# Patient Record
Sex: MALE | Race: BLACK OR AFRICAN AMERICAN | Employment: FULL TIME
[De-identification: names, ages, dates, MRNs, and addresses within clinical notes are randomized per-mention and may not be internally consistent; named-entity substitution may affect disease eponyms.]

---

## 2024-09-03 ENCOUNTER — HOSPITAL ENCOUNTER (EMERGENCY)
Facility: HOSPITAL | Age: 49
Discharge: HOME | End: 2024-09-03
Attending: EMERGENCY MEDICINE
Payer: COMMERCIAL

## 2024-09-03 ENCOUNTER — APPOINTMENT (OUTPATIENT)
Facility: HOSPITAL | Age: 49
End: 2024-09-03
Payer: COMMERCIAL

## 2024-09-03 VITALS
RESPIRATION RATE: 17 BRPM | HEIGHT: 73 IN | HEART RATE: 81 BPM | SYSTOLIC BLOOD PRESSURE: 147 MMHG | WEIGHT: 265 LBS | TEMPERATURE: 98 F | DIASTOLIC BLOOD PRESSURE: 84 MMHG | BODY MASS INDEX: 35.12 KG/M2 | OXYGEN SATURATION: 99 %

## 2024-09-03 DIAGNOSIS — S89.91XA INJURY OF RIGHT LOWER EXTREMITY, INITIAL ENCOUNTER: Primary | ICD-10-CM

## 2024-09-03 PROCEDURE — 73590 X-RAY EXAM OF LOWER LEG: CPT

## 2024-09-03 PROCEDURE — 6370000000 HC RX 637 (ALT 250 FOR IP)

## 2024-09-03 PROCEDURE — 73610 X-RAY EXAM OF ANKLE: CPT

## 2024-09-03 PROCEDURE — 99283 EMERGENCY DEPT VISIT LOW MDM: CPT

## 2024-09-03 RX ORDER — DICLOFENAC SODIUM 75 MG/1
75 TABLET, DELAYED RELEASE ORAL 2 TIMES DAILY
Qty: 60 TABLET | Refills: 0 | Status: SHIPPED | OUTPATIENT
Start: 2024-09-03

## 2024-09-03 RX ORDER — IBUPROFEN 600 MG/1
600 TABLET, FILM COATED ORAL
Status: COMPLETED | OUTPATIENT
Start: 2024-09-03 | End: 2024-09-03

## 2024-09-03 RX ADMIN — IBUPROFEN 600 MG: 600 TABLET, FILM COATED ORAL at 14:45

## 2024-09-03 ASSESSMENT — PAIN DESCRIPTION - PAIN TYPE: TYPE: ACUTE PAIN

## 2024-09-03 ASSESSMENT — PAIN DESCRIPTION - LOCATION
LOCATION: LEG

## 2024-09-03 ASSESSMENT — PAIN - FUNCTIONAL ASSESSMENT
PAIN_FUNCTIONAL_ASSESSMENT: 0-10
PAIN_FUNCTIONAL_ASSESSMENT: PREVENTS OR INTERFERES WITH MANY ACTIVE NOT PASSIVE ACTIVITIES

## 2024-09-03 ASSESSMENT — PAIN DESCRIPTION - ORIENTATION
ORIENTATION: RIGHT;LOWER

## 2024-09-03 ASSESSMENT — PAIN SCALES - GENERAL
PAINLEVEL_OUTOF10: 6
PAINLEVEL_OUTOF10: 5
PAINLEVEL_OUTOF10: 5

## 2024-09-03 ASSESSMENT — PAIN DESCRIPTION - DESCRIPTORS: DESCRIPTORS: TEARING

## 2024-09-03 NOTE — ED TRIAGE NOTES
Pt arrives via wheelchair reporting he hurt his leg while pushing a generator.  Pt can put some weight on his foot but not a lot.

## 2024-09-03 NOTE — DISCHARGE INSTRUCTIONS
You were seen today in the emergency department for an injury to your leg.  X-ray imaging shows no abnormalities.  On exam it does not appear that you have torn your Achilles tendon.  You likely have injured a smaller ligament or tendon.  I have placed you in a walking boot to stabilize the ankle and leg until you can see orthopedics.  Call the number above to schedule an appointment with orthopedics.  Elevate and ice the leg.  You can take ibuprofen or Tylenol as needed for pain.

## 2024-09-03 NOTE — ED PROVIDER NOTES
[facility] EMERGENCY DEPT  EMERGENCY DEPARTMENT ENCOUNTER      Pt Name: Chico Pennington  MRN: [id number]  Birthdate 1975  Date of evaluation: 9/3/2024  Provider: Maya Stewart PA-C    CHIEF COMPLAINT       Chief Complaint   Patient presents with    Leg Injury     Right         HISTORY OF PRESENT ILLNESS   (Location/Symptom, Timing/Onset, Context/Setting, Quality, Duration, Modifying Factors, Severity)  Note limiting factors.   Chico Pennington is a 49 y.o. male with history of  has no past medical history on file. who presents from home to [facility] ED with cc of right leg pain following an injury. Patient was pushing a generator when he felt a pop in the back of his right leg.  Patient has not been able to bear weight well since the injury.  Notes some swelling in the ankle.  No medication prior to arrival.  Denies any other injuries.            PCP: No primary care provider on file.    There are no other complaints, changes or physical findings at this time.        The history is provided by the patient.         Review of External Medical Records:     Nursing Notes were reviewed.    REVIEW OF SYSTEMS    (2-9 systems for level 4, 10 or more for level 5)     Review of Systems   Musculoskeletal:  Positive for arthralgias.       Except as noted above the remainder of the review of systems was reviewed and negative.       PAST MEDICAL HISTORY   No past medical history on file.      SURGICAL HISTORY     No past surgical history on file.      CURRENT MEDICATIONS       Previous Medications    No medications on file       ALLERGIES     Sulfa antibiotics    FAMILY HISTORY     No family history on file.       SOCIAL HISTORY       Social History     Socioeconomic History    Marital status:            PHYSICAL EXAM    (up to 7 for level 4, 8 or more for level 5)     ED Triage Vitals [09/03/24 1430]   BP Systolic BP Percentile Diastolic BP Percentile Temp Temp src Pulse Respirations SpO2   (!) 153/93 -- -- 98

## 2024-09-03 NOTE — ED NOTES
DC paperwork reviewed, boot/ crutch teaching/care/precautions reviewed, pt verbalized understanding.